# Patient Record
Sex: FEMALE | Race: OTHER | Employment: FULL TIME | URBAN - METROPOLITAN AREA
[De-identification: names, ages, dates, MRNs, and addresses within clinical notes are randomized per-mention and may not be internally consistent; named-entity substitution may affect disease eponyms.]

---

## 2019-04-17 PROBLEM — F41.8 MIXED ANXIETY AND DEPRESSIVE DISORDER: Status: ACTIVE | Noted: 2018-12-13

## 2019-04-17 PROBLEM — E03.9 HYPOTHYROIDISM: Status: ACTIVE | Noted: 2018-09-10

## 2019-05-08 PROBLEM — I83.90 VARICOSE VEIN OF LEG: Status: ACTIVE | Noted: 2019-05-08

## 2019-08-01 PROCEDURE — 87086 URINE CULTURE/COLONY COUNT: CPT | Performed by: FAMILY MEDICINE

## 2019-10-06 ENCOUNTER — APPOINTMENT (OUTPATIENT)
Dept: GENERAL RADIOLOGY | Age: 49
End: 2019-10-06
Attending: FAMILY MEDICINE
Payer: COMMERCIAL

## 2019-10-06 ENCOUNTER — HOSPITAL ENCOUNTER (OUTPATIENT)
Age: 49
Discharge: HOME | End: 2019-10-06
Attending: FAMILY MEDICINE
Payer: COMMERCIAL

## 2019-10-06 VITALS
OXYGEN SATURATION: 97 % | HEIGHT: 65 IN | TEMPERATURE: 99 F | SYSTOLIC BLOOD PRESSURE: 105 MMHG | WEIGHT: 172 LBS | HEART RATE: 76 BPM | RESPIRATION RATE: 20 BRPM | DIASTOLIC BLOOD PRESSURE: 68 MMHG | BODY MASS INDEX: 28.66 KG/M2

## 2019-10-06 DIAGNOSIS — S70.02XA CONTUSION OF LEFT HIP, INITIAL ENCOUNTER: ICD-10-CM

## 2019-10-06 DIAGNOSIS — S42.255A CLOSED NONDISPLACED FRACTURE OF GREATER TUBEROSITY OF LEFT HUMERUS, INITIAL ENCOUNTER: Primary | ICD-10-CM

## 2019-10-06 PROCEDURE — 73030 X-RAY EXAM OF SHOULDER: CPT | Performed by: FAMILY MEDICINE

## 2019-10-06 PROCEDURE — 73502 X-RAY EXAM HIP UNI 2-3 VIEWS: CPT | Performed by: FAMILY MEDICINE

## 2019-10-06 PROCEDURE — 29105 APPLICATION LONG ARM SPLINT: CPT

## 2019-10-06 PROCEDURE — 99204 OFFICE O/P NEW MOD 45 MIN: CPT

## 2019-10-06 PROCEDURE — 96372 THER/PROPH/DIAG INJ SC/IM: CPT

## 2019-10-06 RX ORDER — NAPROXEN 500 MG/1
500 TABLET ORAL 2 TIMES DAILY PRN
Qty: 20 TABLET | Refills: 0 | Status: SHIPPED | OUTPATIENT
Start: 2019-10-06 | End: 2019-10-16

## 2019-10-06 RX ORDER — HYDROCODONE BITARTRATE AND ACETAMINOPHEN 5; 325 MG/1; MG/1
1-2 TABLET ORAL EVERY 6 HOURS PRN
Qty: 12 TABLET | Refills: 0 | Status: SHIPPED | OUTPATIENT
Start: 2019-10-06 | End: 2019-10-09

## 2019-10-06 RX ORDER — KETOROLAC TROMETHAMINE 30 MG/ML
30 INJECTION, SOLUTION INTRAMUSCULAR; INTRAVENOUS ONCE
Status: COMPLETED | OUTPATIENT
Start: 2019-10-06 | End: 2019-10-06

## 2019-10-06 NOTE — ED INITIAL ASSESSMENT (HPI)
Gisela  on left side while mopping the floor this afternoon. Here with complaints of left upper extremity pain.

## 2019-10-06 NOTE — ED PROVIDER NOTES
Patient Seen in: [facility]      History   Patient presents with:  Upper Extremity Injury (musculoskeletal)    Stated Complaint: left shoulder/side, pt fell at home    HPI    17-year-old right-hand-dominant female presents the I family members  HEENT: Normocephalic atraumatic. Negative leo sign. Negative raccoon eyes. EOMI, PERRLA. No subconjunctival hemorrhage. No hemotympanum.   Moist.  Neck: No acute deformity, supple, no tenderness on palpation of C-spine or posterior ne MDM     55-year-old female status post fall about 1 hour prior to arrival.  Patient fell on her wood laminate kitchen floor while mopping. She has pain to the left shoulder with decreased range of motion. She denies any numbness or weakness.

## 2019-10-09 PROBLEM — S42.255A CLOSED NONDISPLACED FRACTURE OF GREATER TUBEROSITY OF LEFT HUMERUS, INITIAL ENCOUNTER: Status: ACTIVE | Noted: 2019-10-09

## 2020-10-28 PROBLEM — M23.92 INTERNAL DERANGEMENT OF KNEE, LEFT: Status: ACTIVE | Noted: 2020-10-28

## 2020-10-28 PROBLEM — M22.42 CHONDROMALACIA OF PATELLOFEMORAL JOINT, LEFT: Status: ACTIVE | Noted: 2020-10-28

## 2020-10-28 PROBLEM — M75.82 ROTATOR CUFF TENDINITIS, LEFT: Status: ACTIVE | Noted: 2020-10-28

## 2024-02-07 ENCOUNTER — HOSPITAL ENCOUNTER (EMERGENCY)
Age: 54
Discharge: HOME | End: 2024-02-07
Attending: EMERGENCY MEDICINE
Payer: COMMERCIAL

## 2024-02-07 ENCOUNTER — APPOINTMENT (OUTPATIENT)
Dept: ULTRASOUND IMAGING | Age: 54
End: 2024-02-07
Payer: COMMERCIAL

## 2024-02-07 VITALS
OXYGEN SATURATION: 97 % | SYSTOLIC BLOOD PRESSURE: 109 MMHG | HEIGHT: 64 IN | BODY MASS INDEX: 30.73 KG/M2 | TEMPERATURE: 98 F | DIASTOLIC BLOOD PRESSURE: 59 MMHG | RESPIRATION RATE: 18 BRPM | HEART RATE: 58 BPM | WEIGHT: 180 LBS

## 2024-02-07 DIAGNOSIS — I82.5Y2 CHRONIC DEEP VEIN THROMBOSIS (DVT) OF PROXIMAL VEIN OF LEFT LOWER EXTREMITY (HCC): ICD-10-CM

## 2024-02-07 DIAGNOSIS — M79.652 LEFT THIGH PAIN: Primary | ICD-10-CM

## 2024-02-07 LAB
ANION GAP SERPL CALC-SCNC: 1 MMOL/L (ref 0–18)
BUN BLD-MCNC: 18 MG/DL (ref 9–23)
CALCIUM BLD-MCNC: 9 MG/DL (ref 8.5–10.1)
CHLORIDE SERPL-SCNC: 107 MMOL/L (ref 98–112)
CO2 SERPL-SCNC: 30 MMOL/L (ref 21–32)
CREAT BLD-MCNC: 0.86 MG/DL
D DIMER PPP FEU-MCNC: <0.27 UG/ML FEU (ref ?–0.55)
EGFRCR SERPLBLD CKD-EPI 2021: 80 ML/MIN/1.73M2 (ref 60–?)
GLUCOSE BLD-MCNC: 98 MG/DL (ref 70–99)
OSMOLALITY SERPL CALC.SUM OF ELEC: 288 MOSM/KG (ref 275–295)
POTASSIUM SERPL-SCNC: 4.3 MMOL/L (ref 3.5–5.1)
SODIUM SERPL-SCNC: 138 MMOL/L (ref 136–145)

## 2024-02-07 PROCEDURE — 36415 COLL VENOUS BLD VENIPUNCTURE: CPT

## 2024-02-07 PROCEDURE — 93971 EXTREMITY STUDY: CPT | Performed by: EMERGENCY MEDICINE

## 2024-02-07 PROCEDURE — 85379 FIBRIN DEGRADATION QUANT: CPT | Performed by: EMERGENCY MEDICINE

## 2024-02-07 PROCEDURE — 99285 EMERGENCY DEPT VISIT HI MDM: CPT

## 2024-02-07 PROCEDURE — 99284 EMERGENCY DEPT VISIT MOD MDM: CPT

## 2024-02-07 PROCEDURE — 80048 BASIC METABOLIC PNL TOTAL CA: CPT | Performed by: EMERGENCY MEDICINE

## 2024-02-07 NOTE — ED PROVIDER NOTES
Patient Seen in: [facility] In [place]      History     Chief Complaint   Patient presents with    Swelling Edema     Stated Complaint: Patient presents with left leg swelling and pain. recently stopped eliquis 01/08    Subjective:   HPI    Patient is a 53-year-old female who presents with increasing left thigh swelling and pain.  She has a history of DVT in September 2022 and has a known chronic DVT in the thigh that is followed by her primary care physician.  Her Eliquis was discontinued 1 month ago.  She has been feeling well but states a few days ago started getting increasing pain in her anterior and medial left proximal thigh and is concerned that her blood clot has come back or worsen.  She states this is what it feels like when it first started.  She feels that it is swollen as well.  She also states for the last few days, just at night she has felt vaguely short of breath.  No chest pain or tightness.  No cough or illnesses.    Objective:   Past Medical History:   Diagnosis Date    Depression     Esophageal reflux     History of blood clots     Hypothyroid               Past Surgical History:   Procedure Laterality Date    TOTAL ABDOM HYSTERECTOMY                  Social History     Socioeconomic History    Marital status:    Tobacco Use    Smoking status: Some Days     Types: Cigarettes    Smokeless tobacco: Never   Vaping Use    Vaping Use: Never used   Substance and Sexual Activity    Alcohol use: Not Currently     Comment: rare    Drug use: Never    Sexual activity: Yes     Partners: Male     Comment: ablation               Review of Systems    Positive for stated complaint: Patient presents with left leg swelling and pain. recently stopped eliquis 01/08  Other systems are as noted in HPI.  Constitutional and vital signs reviewed.      All other systems reviewed and negative except as noted above.    Physical Exam     ED Triage Vitals [02/07/24 1446]   /71   Pulse 71    Resp 18   Temp 98.2 °F (36.8 °C)   Temp src Temporal   SpO2 96 %   O2 Device None (Room air)       Current:/79   Pulse 56   Temp 98.2 °F (36.8 °C) (Temporal)   Resp 18   Ht 162.6 cm (5' 4\")   Wt 81.6 kg   SpO2 98%   BMI 30.90 kg/m²         Physical Exam  Vitals and nursing note reviewed.   Constitutional:       Appearance: She is well-developed.   HENT:      Head: Normocephalic and atraumatic.   Eyes:      Conjunctiva/sclera: Conjunctivae normal.      Pupils: Pupils are equal, round, and reactive to light.   Cardiovascular:      Rate and Rhythm: Normal rate and regular rhythm.      Heart sounds: Normal heart sounds.   Pulmonary:      Effort: Pulmonary effort is normal.      Breath sounds: Normal breath sounds.   Abdominal:      General: Bowel sounds are normal.      Palpations: Abdomen is soft.   Musculoskeletal:         General: Normal range of motion.      Cervical back: Normal range of motion and neck supple.      Comments: Minimal tenderness in the mid and proximal left thigh.  No significant soft tissue swelling.  No calf tenderness.   Skin:     General: Skin is warm and dry.   Neurological:      Mental Status: She is alert and oriented to person, place, and time.               ED Course     Labs Reviewed   BASIC METABOLIC PANEL (8) - Normal   D-DIMER - Normal             US VENOUS DOPPLER LEG LEFT - DIAG IMG (CPT=93971)    Result Date: 2/7/2024  PROCEDURE:  US VENOUS DOPPLER LEG LEFT - DIAG IMG (CPT=93971)  COMPARISON:  None.  INDICATIONS:  eval for DVT  TECHNIQUE:  Real time, grey scale, and duplex ultrasound was used to evaluate the lower extremity venous system. B-mode two-dimensional images of the vascular structures, Doppler spectral analysis, and color flow.  Doppler imaging were performed.  The following veins were imaged:  Common, deep, and superficial femoral, popliteal, sapheno-femoral junction, posterior tibial veins, and the contralateral common femoral vein.  PATIENT STATED HISTORY:  (As transcribed by Technologist)  Patient states she has left leg pain and swelling today with slow breathing. She states she stopped Eliquis one month ago.    FINDINGS:  EXTREMITY EXAMINED:  LEFT LOWER EXTREMITY SAPHENOFEMORAL JUNCTION:  No reflux. THROMBI:  PARTIALLY OCCLUSIVE THROMBUS IN THE LEFT COMMON FEMORAL VEIN. COMPRESSION:  Normal compressibility, phasicity, and augmentation IS OTHERWISE NOTED.. OTHER:  Negative.            CONCLUSION:   There is suggestion of a partially occlusive DVT of unknown chronicity in the left common femoral vein.  This critical result was discussed with Dr. Fan at 1536 hours on 2/7/2024. Read back was performed.    LOCATION:  [place] [id number]    Dictated by (CST): Juan Bragg MD on 2/07/2024 at 3:33 PM     Finalized by (CST): Juan Bragg MD on 2/07/2024 at 3:36 PM               MDM      Patient is a 53-year-old female with a history of chronic DVT in her left leg that is followed by her primary care physician.  Last ultrasound was about a month ago and showed no change from previous.  Eliquis was discontinued after that, and then the last couple days she has had increasing pain in her medial and anterior left thigh that concerns her for worsening DVT.  Mild tenderness on exam, no significant edema.  Venous Doppler ordered in triage and we will follow-up on that results.  However she reports last couple days she has felt vaguely short of breath and in light of that, depending on ultrasound results we may need to send a D-dimer to evaluate for acute thrombus.      Update at 4:45 PM.  Workup is unremarkable.  Venous Doppler as above demonstrates partially occlusive DVT in left common femoral vein.  I was able to review the written reports of her ultrasound at outside facility and it appears this is quite similar.  Negative D-dimer rules out acute thromboembolic disease and she remains well-appearing here.  She can be discharged.      Past Medical History-chronic  DVT    Differential diagnosis before testing included acute DVT, chronic DVT    Co-morbidities that add to the complexity of management include: None    Testing ordered during this visit included labs, venous Doppler    Radiographic images  I personally reviewed the radiographs and my individual interpretation shows partially occlusive DVT in left thigh  I also reviewed the official reports that showed partially occlusive DVT in the left thigh    External chart review showed reviewed care everywhere for outpatient ultrasound results            Disposition:          Discharge  I have discussed with the patient the results of test, differential diagnosis, treatment plan, warning signs and symptoms which should prompt immediate return.  They expressed understanding of these instructions and agrees to the following plan provided.  They were given written discharge instructions and agrees to return for any concerns and voiced understanding and all questions were answered.                           Medical Decision Making      Disposition and Plan     Clinical Impression:  1. Left thigh pain    2. Chronic deep vein thrombosis (DVT) of proximal vein of left lower extremity (HCC)         Disposition:  Discharge  2/7/2024  4:50 pm    Follow-up:  Robert Swann MD  [address] [facility]  [address]  [facility] [id number] [phone]    Follow up            Medications Prescribed:  Current Discharge Medication List

## (undated) NOTE — LETTER
Date & Time: 10/6/2019, 4:29 PM  Patient: Queen Drew  Encounter Provider(s):    Bartolome Angeles MD       To Whom It May Concern:    Alli Carbajal was seen and treated in our department on 10/6/2019.  She can return to work with these limitations: No